# Patient Record
(demographics unavailable — no encounter records)

---

## 2025-03-25 NOTE — ASSESSMENT
[FreeTextEntry1] : Pt is a 19 year old male with left testicular pain. nl exam  scrotal sono  UA and culture done today Chlamydia/GC Amplification course of nsaids

## 2025-03-25 NOTE — END OF VISIT
[FreeTextEntry4] : This note was written by Isabel Salazar on 03/25/2025 actively solely Lazaro Chew M.D. I, Isabel Salazar, am scribing for and in the presence of Lazaro Chew M.D. in the following sections HISTORY OF PRESENT ILLNESS, PAST MEDICAL/FAMILY/SOCIAL HISTORY; REVIEW OF SYSTEMS; VITAL SIGNS; PHYSICAL EXAM; ASSESSMENT/PLAN. All medical record entries made by this scribe at , Lazaro Chew M.D. direction and personally dictated by me on 03/25/2025. I personally performed the services described in the documentation, reviewed the documentation recorded by the scribe in my presence, and it accurately and completely records my words and actions.

## 2025-03-25 NOTE — HISTORY OF PRESENT ILLNESS
[FreeTextEntry1] : 03/25/2025 cc left testicular pain Pt is a 19 year old male presenting for a new patient visit with c/o left testicular pain. Pt reports that for the past week he has noted pain in his left testicle. Pt reports having episodes of left testicular pain in the past, but over the past week this has begun impacting his daily quality of life. Pt reports the pain is more noticeable while sitting and walking. Pt reports the pain is sharp and worsens with movement. Pt reports swelling in testicular area which has resolved over the past few days.  Pt denies issues urinating and denies being sexually active. Pt denies smoking, drinking, or drug use.  Pt reports lifting does not affect pain.    PSHx: Right testicular hydrocele surgery at 3 yrs old, hernia surgery 5 yrs old, and appendectomy